# Patient Record
Sex: MALE | Race: BLACK OR AFRICAN AMERICAN
[De-identification: names, ages, dates, MRNs, and addresses within clinical notes are randomized per-mention and may not be internally consistent; named-entity substitution may affect disease eponyms.]

---

## 2018-03-22 ENCOUNTER — HOSPITAL ENCOUNTER (EMERGENCY)
Dept: HOSPITAL 5 - ED | Age: 28
LOS: 1 days | Discharge: HOME | End: 2018-03-23
Payer: SELF-PAY

## 2018-03-22 DIAGNOSIS — J03.90: Primary | ICD-10-CM

## 2018-03-22 DIAGNOSIS — Z91.018: ICD-10-CM

## 2018-03-22 DIAGNOSIS — Z91.013: ICD-10-CM

## 2018-03-22 DIAGNOSIS — J45.909: ICD-10-CM

## 2018-03-22 DIAGNOSIS — Z88.8: ICD-10-CM

## 2018-03-22 DIAGNOSIS — F12.10: ICD-10-CM

## 2018-03-22 PROCEDURE — 36415 COLL VENOUS BLD VENIPUNCTURE: CPT

## 2018-03-22 PROCEDURE — 85025 COMPLETE CBC W/AUTO DIFF WBC: CPT

## 2018-03-22 PROCEDURE — 99283 EMERGENCY DEPT VISIT LOW MDM: CPT

## 2018-03-22 PROCEDURE — 80048 BASIC METABOLIC PNL TOTAL CA: CPT

## 2018-03-23 VITALS — SYSTOLIC BLOOD PRESSURE: 142 MMHG | DIASTOLIC BLOOD PRESSURE: 80 MMHG

## 2018-03-23 LAB
BASOPHILS # (AUTO): 0 K/MM3 (ref 0–0.1)
BASOPHILS NFR BLD AUTO: 0.2 % (ref 0–1.8)
BUN SERPL-MCNC: 12 MG/DL (ref 9–20)
BUN/CREAT SERPL: 17 %
CALCIUM SERPL-MCNC: 10 MG/DL (ref 8.4–10.2)
EOSINOPHIL # BLD AUTO: 0 K/MM3 (ref 0–0.4)
EOSINOPHIL NFR BLD AUTO: 0 % (ref 0–4.3)
HCT VFR BLD CALC: 42.4 % (ref 35.5–45.6)
HEMOLYSIS INDEX: 1
HGB BLD-MCNC: 14.7 GM/DL (ref 11.8–15.2)
LYMPHOCYTES # BLD AUTO: 0.8 K/MM3 (ref 1.2–5.4)
LYMPHOCYTES NFR BLD AUTO: 7.1 % (ref 13.4–35)
MCH RBC QN AUTO: 29 PG (ref 28–32)
MCHC RBC AUTO-ENTMCNC: 35 % (ref 32–34)
MCV RBC AUTO: 84 FL (ref 84–94)
MONOCYTES # (AUTO): 1.1 K/MM3 (ref 0–0.8)
MONOCYTES % (AUTO): 9.3 % (ref 0–7.3)
PLATELET # BLD: 537 K/MM3 (ref 140–440)
RBC # BLD AUTO: 5.07 M/MM3 (ref 3.65–5.03)

## 2018-03-23 NOTE — EMERGENCY DEPARTMENT REPORT
ED ENT HPI





- General


Chief complaint: Sore Throat


Stated complaint: SORE THROAT


Time Seen by Provider: 03/23/18 02:58


Source: patient


Mode of arrival: Ambulatory


Limitations: No Limitations





- History of Present Illness


Initial comments: 





This is a 27-year-old male nontoxic, well nourished in appearance, no acute 

signs of distress presents to the ED with c/o of sore throat x2 days. Patient 

stated that he had the flu last week and has decreased PO intake due to the 

sore throat. Patient stated that sore throat causes him to get nausea.  Patient 

denies any cough.  Patient denies any recent travels, long car, recent hospital 

stays.  Patient denies any calf pain or calf tenderness.  Patient denies any 

chest pain, short of breath, fever, chills, nausea, vomiting, hemoptysis, 

numbness, tingling, headache or stiff neck.  Patient denies any abdominal pain. 

Patient stated allergies to Metoclopramide HCL. PMH includes asthma and 

pancreatitis.


MD complaint: sore throat


-: days(s) (2)


Location: throat


Severity: mild


Severity scale (0 -10): 8


Consistency: constant


Improves with: none


Worsens with: swallowing


Associated Symptoms: pain with swallowing, sore throat.  denies: fever, cough, 

gum swelling, toothache, tinnitus, hearing loss, discharge from ear, rhinorrhea





- Related Data


 Previous Rx's











 Medication  Instructions  Recorded  Last Taken  Type


 


Omeprazole [PriLOSEC] 20 mg PO QDAY #30 capsule. 05/12/16 Unknown Rx


 


Sucralfate [Carafate] 1 gm PO Q6HR 30 Days  oral.liqd 05/12/16 Unknown Rx


 


Amoxicillin/K Clav Tab [Augmentin 1 tab PO Q12HR #20 tab 03/23/18 Unknown Rx





875 mg]    


 


Ibuprofen [Motrin] 600 mg PO Q8H PRN #30 tablet 03/23/18 Unknown Rx


 


Nystas/Diphen/Xyl Visc/Mylanta 15 ml MM Q4H PRN 10 Days  ml 03/23/18 Unknown Rx





[Magic Mouthwash]    


 


Ondansetron [Zofran Odt] 4 mg PO Q8HR PRN #30 tab.rapdis 03/23/18 Unknown Rx











 Allergies











Allergy/AdvReac Type Severity Reaction Status Date / Time


 


metoclopramide HCl Allergy  Vomiting Verified 05/12/16 17:23





[From [facility]]     


 


turkey Allergy  Unknown Verified 05/12/16 17:23


 


seafood Allergy Intermediate Vomiting Uncoded 05/12/16 17:23














ED Dental HPI





- General


Chief complaint: Sore Throat


Stated complaint: SORE THROAT


Time Seen by Provider: 03/23/18 02:58


Source: patient


Mode of arrival: Ambulatory


Limitations: No Limitations





- Related Data


 Previous Rx's











 Medication  Instructions  Recorded  Last Taken  Type


 


Omeprazole [PriLOSEC] 20 mg PO QDAY #30 capsule. 05/12/16 Unknown Rx


 


Sucralfate [Carafate] 1 gm PO Q6HR 30 Days  oral.liqd 05/12/16 Unknown Rx


 


Amoxicillin/K Clav Tab [Augmentin 1 tab PO Q12HR #20 tab 03/23/18 Unknown Rx





875 mg]    


 


Ibuprofen [Motrin] 600 mg PO Q8H PRN #30 tablet 03/23/18 Unknown Rx


 


Nystas/Diphen/Xyl Visc/Mylanta 15 ml MM Q4H PRN 10 Days  ml 03/23/18 Unknown Rx





[Magic Mouthwash]    


 


Ondansetron [Zofran Odt] 4 mg PO Q8HR PRN #30 tab.rapdis 03/23/18 Unknown Rx











 Allergies











Allergy/AdvReac Type Severity Reaction Status Date / Time


 


metoclopramide HCl Allergy  Vomiting Verified 05/12/16 17:23





[From [facility]]     


 


turkey Allergy  Unknown Verified 05/12/16 17:23


 


seafood Allergy Intermediate Vomiting Uncoded 05/12/16 17:23














ED Review of Systems


ROS: 


Stated complaint: SORE THROAT


Other details as noted in HPI





Constitutional: denies: chills, fever


Eyes: denies: eye pain, eye discharge, vision change


ENT: throat pain.  denies: ear pain


Respiratory: denies: cough, shortness of breath, wheezing


Cardiovascular: denies: chest pain, palpitations


Endocrine: no symptoms reported


Gastrointestinal: denies: abdominal pain, nausea, diarrhea


Genitourinary: denies: urgency, dysuria


Musculoskeletal: denies: back pain, joint swelling, arthralgia


Skin: denies: rash, lesions


Neurological: denies: headache, weakness, paresthesias


Psychiatric: denies: anxiety, depression


Hematological/Lymphatic: denies: easy bleeding, easy bruising





ED Past Medical Hx





- Past Medical History


Previous Medical History?: Yes


Hx Hypertension: No


Hx Congestive Heart Failure: No


Hx Diabetes: No


Hx Asthma: Yes


Hx COPD: No


Hx HIV: No


Additional medical history: pancreatitis





- Surgical History


Past Surgical History?: Yes


Additional Surgical History: intestinal repair as an infant





- Social History


Smoking Status: Never Smoker


Substance Use Type: Marijuana





- Medications


Home Medications: 


 Home Medications











 Medication  Instructions  Recorded  Confirmed  Last Taken  Type


 


Omeprazole [PriLOSEC] 20 mg PO QDAY #30 capsule. 05/12/16  Unknown Rx


 


Sucralfate [Carafate] 1 gm PO Q6HR 30 Days  oral.liqd 05/12/16  Unknown Rx


 


Amoxicillin/K Clav Tab [Augmentin 1 tab PO Q12HR #20 tab 03/23/18  Unknown Rx





875 mg]     


 


Ibuprofen [Motrin] 600 mg PO Q8H PRN #30 tablet 03/23/18  Unknown Rx


 


Nystas/Diphen/Xyl Visc/Mylanta 15 ml MM Q4H PRN 10 Days  ml 03/23/18  Unknown Rx





[Magic Mouthwash]     


 


Ondansetron [Zofran Odt] 4 mg PO Q8HR PRN #30 tab.rapdis 03/23/18  Unknown Rx














ED Physical Exam





- General


Limitations: No Limitations


General appearance: alert, in no apparent distress





- Head


Head exam: Present: atraumatic, normocephalic





- Eye


Eye exam: Present: normal appearance


Pupils: Present: normal accommodation





- ENT


ENT exam: Present: mucous membranes moist, TM's normal bilaterally, normal 

external ear exam





- Expanded ENT Exam


  ** Expanded


Ear exam: Present: normal external inspection


Mouth exam: Present: normal external inspection, tongue normal.  Absent: 

drooling, trismus, muffled voice, tongue elevation, laceration


Teeth exam: Present: normal inspection


Throat exam: Positive: tonsillar erythema, tonsillomegaly, other (Uvula 

midline. No abscess or swelling noted. ).  Negative: tonsillar exudate, R 

peritonsillar mass, L peritonsillar mass





- Neck


Neck exam: Present: normal inspection, full ROM, lymphadenopathy (bilateral 

tonsillar).  Absent: tenderness, meningismus, thyromegaly





- Respiratory


Respiratory exam: Present: normal lung sounds bilaterally.  Absent: respiratory 

distress, wheezes, rales, rhonchi, stridor, chest wall tenderness, accessory 

muscle use, decreased breath sounds, prolonged expiratory





- Cardiovascular


Cardiovascular Exam: Present: regular rate, normal rhythm, normal heart sounds.

  Absent: irregular rhythm, systolic murmur, diastolic murmur, rubs, gallop





- GI/Abdominal


GI/Abdominal exam: Present: soft, normal bowel sounds.  Absent: distended, 

tenderness, guarding, rebound, rigid, diminished bowel sounds





- Rectal


Rectal exam: Present: deferred





- Extremities Exam


Extremities exam: Present: normal inspection, full ROM





- Back Exam


Back exam: Present: normal inspection, full ROM





- Neurological Exam


Neurological exam: Present: alert, oriented X3, normal gait





- Psychiatric


Psychiatric exam: Present: normal affect, normal mood





- Skin


Skin exam: Present: warm, dry, intact, normal color.  Absent: rash





ED Course


 Vital Signs











  03/22/18 03/23/18





  23:58 03:40


 


Temperature 98.7 F 


 


Pulse Rate 82 


 


Respiratory 16 18





Rate  


 


Blood Pressure 148/96 


 


O2 Sat by Pulse 95 





Oximetry  














- Reevaluation(s)


Reevaluation #1: 





03/23/18 03:15


Patient is speaking in full sentences with no signs of distress noted.





ED Medical Decision Making





- Lab Data


Result diagrams: 


 03/23/18 03:17





 03/23/18 03:17





- Medical Decision Making





This is a 27-year-old male that presents with tonsillitis. Patient is stable 

and was examined by me. Labs obtained due to patient stating decreased PO 

intake to r/o dehydration. Patient receveid Lidocaine visous and motrin which 

patient stated symptoms improved and resolved and patient drank 2 apple juices 

with no complications. Patient also receveid Zofran as he stated he gets nausea 

due to sore throat. Patient is discharge with augmentin. Patient was instructed 

to Follow-up with a primary care doctor in 3-5 days or if symptoms worsen and 

continue return to emergency room as soon as possible.  At time of discharge, 

the patient does not seem toxic or ill in appearance.  No acute signs of 

distress noted.  Patient agrees to discharge treatment plan of care.  No 

further questions noted by the patient.


Critical care attestation.: 


If time is entered above; I have spent that time in minutes in the direct care 

of this critically ill patient, excluding procedure time.








ED Disposition


Clinical Impression: 


 Tonsillitis





Disposition: DC-01 TO HOME OR SELFCARE


Is pt being admited?: No


Does the pt Need Aspirin: No


Condition: Stable


Instructions:  Ibuprofen (By mouth), Tonsillitis (ED)


Additional Instructions: 


Follow-up with a primary care doctor in 3-5 days or if symptoms worsen and 

continue return to emergency room as soon as possible. 


Prescriptions: 


Amoxicillin/K Clav Tab [Augmentin 875 mg] 1 tab PO Q12HR #20 tab


Ibuprofen [Motrin] 600 mg PO Q8H PRN #30 tablet


 PRN Reason: Pain


Nystas/Diphen/Xyl Visc/Mylanta [Magic Mouthwash] 15 ml MM Q4H PRN 10 Days  ml


 PRN Reason: Sore Throat


Ondansetron [Zofran Odt] 4 mg PO Q8HR PRN #30 tab.rapdis


 PRN Reason: Nausea


Referrals: 


HOLLY SMITH MD [Primary Care Provider] - 3-5 Days


PRIMARY CARE,MD [Referring] - 3-5 Days


[facility] [Outside] - 3-5 Days


[facility] [Outside] - 3-5 Days


Forms:  Work/School Release Form(ED)